# Patient Record
Sex: MALE
[De-identification: names, ages, dates, MRNs, and addresses within clinical notes are randomized per-mention and may not be internally consistent; named-entity substitution may affect disease eponyms.]

---

## 2018-06-12 PROBLEM — Z00.00 ENCOUNTER FOR PREVENTIVE HEALTH EXAMINATION: Status: ACTIVE | Noted: 2018-06-12

## 2022-04-21 ENCOUNTER — APPOINTMENT (OUTPATIENT)
Dept: ORTHOPEDIC SURGERY | Facility: CLINIC | Age: 62
End: 2022-04-21
Payer: COMMERCIAL

## 2022-04-21 VITALS — WEIGHT: 206 LBS | HEIGHT: 68 IN | BODY MASS INDEX: 31.22 KG/M2

## 2022-04-21 DIAGNOSIS — Z98.890 OTHER SPECIFIED POSTPROCEDURAL STATES: ICD-10-CM

## 2022-04-21 PROCEDURE — 99024 POSTOP FOLLOW-UP VISIT: CPT

## 2022-04-21 NOTE — HISTORY OF PRESENT ILLNESS
[Gradual] : gradual [] : Post Surgical Visit: yes [2] : 2 [0] : 0 [de-identified] :  dos 1/31/22\par 04/21/22: Here for pov3 s/p right knee pmm. Completed PT. Doing well. No pain complaints\par 03/10/22: Here for pov2 s/p right knee arthroscopy, PMM. Attending PT. Reports improvement with right knee pain. Has been experiencing swelling of the right lower leg. \par 2/10/22: Here for pov 1 s/p R knee arthoscopy, PMM. doing well. denies f/c/s [FreeTextEntry1] : rt knee  [FreeTextEntry5] :  ED CROOKS is a 61 year male who is here today for his post op visit. He is doing better since last visit \par  [de-identified] : 01/31/22 [de-identified] : rt knee arthroscopy

## 2022-04-21 NOTE — DISCUSSION/SUMMARY
[de-identified] : 61m s/p meniscectomy right knee\par 1) c/w hep\par 2) activity as tolerated\par 3) rtc prn\par \par Entered by Unique Biggs acting as scribe.\par

## 2022-04-21 NOTE — PHYSICAL EXAM
[Right] : right knee [NL (140)] : flexion 140 degrees [NL (0)] : extension 0 degrees [Negative] : negative Norma's [] : no extensor lag [de-identified] : no pain with squatting